# Patient Record
Sex: MALE | Employment: FULL TIME | URBAN - METROPOLITAN AREA
[De-identification: names, ages, dates, MRNs, and addresses within clinical notes are randomized per-mention and may not be internally consistent; named-entity substitution may affect disease eponyms.]

---

## 2023-05-01 ENCOUNTER — HOSPITAL ENCOUNTER (OUTPATIENT)
Age: 28
Discharge: HOME | End: 2023-05-01
Payer: COMMERCIAL

## 2023-05-01 VITALS
BODY MASS INDEX: 27.4 KG/M2 | HEIGHT: 69 IN | OXYGEN SATURATION: 98 % | WEIGHT: 185 LBS | TEMPERATURE: 98 F | SYSTOLIC BLOOD PRESSURE: 157 MMHG | HEART RATE: 65 BPM | DIASTOLIC BLOOD PRESSURE: 78 MMHG | RESPIRATION RATE: 18 BRPM

## 2023-05-01 DIAGNOSIS — R09.81 NASAL SINUS CONGESTION: Primary | ICD-10-CM

## 2023-05-01 PROCEDURE — 99203 OFFICE O/P NEW LOW 30 MIN: CPT | Performed by: NURSE PRACTITIONER

## 2023-05-01 RX ORDER — FLUTICASONE PROPIONATE 50 MCG
2 SPRAY, SUSPENSION (ML) NASAL DAILY
Qty: 16 G | Refills: 0 | Status: SHIPPED | OUTPATIENT
Start: 2023-05-01 | End: 2023-05-31

## 2023-05-01 NOTE — DISCHARGE INSTRUCTIONS
Take the fluticasone nasal spray as prescribed. Cool mist humidifier in the same room. Hot steam showers. Push fluids, preferably water. Please read the attached discharge instructions. Go to your nearest ER for new or worsening symptoms.

## 2023-05-01 NOTE — ED INITIAL ASSESSMENT (HPI)
Pt c/o right eye and face pressure for the past week.   Pt had relief with mucinex d and hot showers

## 2024-05-25 ENCOUNTER — HOSPITAL ENCOUNTER (OUTPATIENT)
Age: 29
Discharge: HOME | End: 2024-05-25

## 2024-05-25 VITALS
OXYGEN SATURATION: 100 % | TEMPERATURE: 97 F | HEART RATE: 66 BPM | BODY MASS INDEX: 27.4 KG/M2 | DIASTOLIC BLOOD PRESSURE: 109 MMHG | SYSTOLIC BLOOD PRESSURE: 159 MMHG | WEIGHT: 185 LBS | RESPIRATION RATE: 20 BRPM | HEIGHT: 69 IN

## 2024-05-25 DIAGNOSIS — M62.830 BACK MUSCLE SPASM: Primary | ICD-10-CM

## 2024-05-25 RX ORDER — CYCLOBENZAPRINE HCL 10 MG
10 TABLET ORAL 3 TIMES DAILY PRN
Qty: 20 TABLET | Refills: 0 | Status: SHIPPED | OUTPATIENT
Start: 2024-05-25 | End: 2024-06-01

## 2024-05-25 RX ORDER — PREDNISONE 20 MG/1
40 TABLET ORAL DAILY
Qty: 10 TABLET | Refills: 0 | Status: SHIPPED | OUTPATIENT
Start: 2024-05-25 | End: 2024-05-30

## 2024-05-25 NOTE — DISCHARGE INSTRUCTIONS
Apply heat to the area 2-3 times a day for 20 minutes at a time.  Take over-the-counter NSAIDs such as ibuprofen or naproxen for pain and swelling.  May also take Tylenol for pain.  Take Flexeril as prescribed for muscle relaxant and prednisone to help with inflammation.  Take prednisone in the morning because it can keep you up at night.  May apply Biofreeze or icy hot to the area as well.  Do stretching exercises throughout today.

## 2024-05-25 NOTE — ED PROVIDER NOTES
Patient Seen in: [facility]      History     Chief Complaint   Patient presents with    Back Pain     Stated Complaint: back pain    Subjective:   28-year-old male presents today with sudden onset of back pain after lifting a grill.  States went to put it down when he stood back up fully immediate spasm grab type pain to the left lower back.  No numbness or weakness no tingling to the lower extremities.  Denies any other symptoms or concerns.  Did take Advil prior to arrival.  The patient's medication list, past medical history and social history elements as listed in today's nurse's notes were reviewed and agreed (except as otherwise stated in the HPI).  The patient's family history reviewed and determined to be noncontributory to the presenting problem            Objective:   History reviewed. No pertinent past medical history.           History reviewed. No pertinent surgical history.             Social History     Socioeconomic History    Marital status: Single   Tobacco Use    Smoking status: Never    Smokeless tobacco: Never   Vaping Use    Vaping status: Never Used   Substance and Sexual Activity    Alcohol use: Never    Drug use: Yes     Types: Cannabis     Comment: vape              Review of Systems    Positive for stated complaint: back pain  Other systems are as noted in HPI.  Constitutional and vital signs reviewed.      All other systems reviewed and negative except as noted above.    Physical Exam     ED Triage Vitals [05/25/24 1339]   BP (!) 159/109   Pulse 66   Resp 20   Temp 97.3 °F (36.3 °C)   Temp src Temporal   SpO2 100 %   O2 Device None (Room air)       Current Vitals:   Vital Signs  BP: (!) 159/109  Pulse: 66  Resp: 20  Temp: 97.3 °F (36.3 °C)  Temp src: Temporal    Oxygen Therapy  SpO2: 100 %  O2 Device: None (Room air)            Physical Exam  Vitals and nursing note reviewed.   Constitutional:       Appearance: Normal appearance.   HENT:      Mouth/Throat:      Mouth: Mucous  membranes are moist.   Cardiovascular:      Rate and Rhythm: Normal rate.   Pulmonary:      Effort: Pulmonary effort is normal.   Musculoskeletal:      Comments: Left lower and mid back.  No bony tenderness no step-off.   Skin:     General: Skin is warm and dry.   Neurological:      General: No focal deficit present.      Mental Status: He is alert and oriented to person, place, and time.      Comments: Good dorsiflexion bilateral great toes.  Normal straight leg raise but with increased pain.  Normal sensation upper lower legs.               ED Course   Labs Reviewed - No data to display                   MDM   Please note that this report has been produced using speech recognition software and may contain errors related to that system including, but not limited to, errors in grammar, punctuation, and spelling, as well as words and phrases that possibly may have been recognized inappropriately.  If there are any questions or concerns, contact the dictating provider for clarification.        Note to patient: The 21st Century Cures Act makes medical notes like these available to patients in the interest of transparency. However, this is a medical document intended as peer to peer communication. It is written in medical language and may contain abbreviations or verbiage that are unfamiliar. It may appear blunt or direct. Medical documents are intended to carry relevant information, facts as evident, and the clinical opinion of the practitioner.                                   Medical Decision Making  Differential diagnosis includes but is not limited to: Back strain, nerve root compression, degenerative disc disease, spinal stenosis, sciatica      Presents today with injury to the left lower mid back after lifting a grill.  Has pain and stiffness.  Did take Advil prior to arrival.  Suspect muscle spasm/strain.  Will give prescription for prednisone as well as Flexeril.  Back care instructions given.  To follow-up  with primary care physician 1 week if symptoms do not improve.  Go directly to ER with any uncontrolled pain numbness weakness or any worsening symptoms.  Patient verbalized understanding and agreed to plan of care.    Risk  OTC drugs.  Prescription drug management.        Disposition and Plan     Clinical Impression:  1. Back muscle spasm         Disposition:  Discharge  5/25/2024  1:47 pm    Follow-up:  Haim Cantor MD  [address]  [facility] [address]  [phone]    In 1 week  As needed          Medications Prescribed:  Current Discharge Medication List        START taking these medications    Details   predniSONE 20 MG Oral Tab Take 2 tablets (40 mg total) by mouth daily for 5 days.  Qty: 10 tablet, Refills: 0      cyclobenzaprine 10 MG Oral Tab Take 1 tablet (10 mg total) by mouth 3 (three) times daily as needed for Muscle spasms.  Qty: 20 tablet, Refills: 0

## 2025-04-20 ENCOUNTER — HOSPITAL ENCOUNTER (OUTPATIENT)
Age: 30
Discharge: HOME | End: 2025-04-20
Payer: COMMERCIAL

## 2025-04-20 ENCOUNTER — APPOINTMENT (OUTPATIENT)
Dept: GENERAL RADIOLOGY | Age: 30
End: 2025-04-20
Attending: NURSE PRACTITIONER
Payer: COMMERCIAL

## 2025-04-20 VITALS
SYSTOLIC BLOOD PRESSURE: 153 MMHG | HEART RATE: 60 BPM | TEMPERATURE: 98 F | RESPIRATION RATE: 18 BRPM | DIASTOLIC BLOOD PRESSURE: 90 MMHG | OXYGEN SATURATION: 98 %

## 2025-04-20 DIAGNOSIS — S39.012A STRAIN OF LUMBAR REGION, INITIAL ENCOUNTER: Primary | ICD-10-CM

## 2025-04-20 LAB
BILIRUB UR QL STRIP: NEGATIVE
CLARITY UR: CLEAR
COLOR UR: YELLOW
GLUCOSE UR STRIP-MCNC: NEGATIVE MG/DL
HGB UR QL STRIP: NEGATIVE
KETONES UR STRIP-MCNC: NEGATIVE MG/DL
LEUKOCYTE ESTERASE UR QL STRIP: NEGATIVE
NITRITE UR QL STRIP: NEGATIVE
PH UR STRIP: 7 [PH]
PROT UR STRIP-MCNC: NEGATIVE MG/DL
SP GR UR STRIP: 1.02
UROBILINOGEN UR STRIP-ACNC: <2 MG/DL

## 2025-04-20 PROCEDURE — 72110 X-RAY EXAM L-2 SPINE 4/>VWS: CPT | Performed by: NURSE PRACTITIONER

## 2025-04-20 PROCEDURE — 96372 THER/PROPH/DIAG INJ SC/IM: CPT | Performed by: NURSE PRACTITIONER

## 2025-04-20 PROCEDURE — 99203 OFFICE O/P NEW LOW 30 MIN: CPT | Performed by: NURSE PRACTITIONER

## 2025-04-20 PROCEDURE — 81002 URINALYSIS NONAUTO W/O SCOPE: CPT | Performed by: NURSE PRACTITIONER

## 2025-04-20 RX ORDER — CYCLOBENZAPRINE HCL 10 MG
10 TABLET ORAL 3 TIMES DAILY PRN
Qty: 20 TABLET | Refills: 0 | Status: SHIPPED | OUTPATIENT
Start: 2025-04-20 | End: 2025-04-27

## 2025-04-20 RX ORDER — PREDNISONE 20 MG/1
40 TABLET ORAL DAILY
Qty: 8 TABLET | Refills: 0 | Status: SHIPPED | OUTPATIENT
Start: 2025-04-20 | End: 2025-04-24

## 2025-04-20 RX ORDER — PREDNISONE 20 MG/1
60 TABLET ORAL ONCE
Status: COMPLETED | OUTPATIENT
Start: 2025-04-20 | End: 2025-04-20

## 2025-04-20 RX ORDER — LIDOCAINE 50 MG/G
1 PATCH TOPICAL EVERY 24 HOURS
Qty: 4 PATCH | Refills: 0 | Status: SHIPPED | OUTPATIENT
Start: 2025-04-20 | End: 2025-04-24

## 2025-04-20 RX ORDER — KETOROLAC TROMETHAMINE 30 MG/ML
30 INJECTION, SOLUTION INTRAMUSCULAR; INTRAVENOUS ONCE
Status: COMPLETED | OUTPATIENT
Start: 2025-04-20 | End: 2025-04-20

## 2025-04-20 NOTE — DISCHARGE INSTRUCTIONS
You may take Motrin 600mg as needed every 6-8 hours. Take this with food.  Take the steroid as directed however do not begin this until tomorrow as you had your first dose here in the immediate care today.  If additional pain control is needed, you may use the muscle relaxant as directed however be aware this medication can cause drowsiness.    Rest from exacerbating activities but do not stay sedentary. Apply ice for 20min at a time every 2 hours while awake when possible for the next 3 days. Follow up with your primary care provider within the next 3 days - if symptoms do not resolve, you may need further treatment. Seek additional care in the ER immediately for numbness in your groin, loss of bowel or bladder function, inability to walk, or other new/worsening symptoms.

## 2025-04-20 NOTE — ED INITIAL ASSESSMENT (HPI)
Patient states he brought groceries in to the house yesterday and after putting them down he stood up and felt pain in his left lower back.

## 2025-04-20 NOTE — ED PROVIDER NOTES
Patient Seen in: [facility]    History   CC: back pain  HPI: Florian Marr 29 year old male  who presents c/o diffuse lower back pain however worse on the left as compared to the right s/p incident in which pt states he was carrying groceries and set them on the ground. Upon standing, felt sudden onset pain. Has been heating and icing with worsening pain thus prompting eval. Similar pain last year seen in this IC and given steroids and muscle relaxants with improvement. Denies abd pain, n/v/d/c, urinary s/s, fever, rash, radicular s/s including radiation of pain, numbness/tingling/weakness or limitation in rom to LE bilat, saddle anesthesia's or loss of bowel/bladder control.     Past Medical History[1]    Past Surgical History[2]    Family History[3]    Short Social Hx on File[4]    ROS:  Systems reviewed: All pertinent positives noted in HPI. Unless otherwise noted, additional systems reviewed are negative.   Vital signs reviewed.    Positive for stated complaint: back pain  Other systems are as noted in HPI.  Constitutional and vital signs reviewed.      All other systems reviewed and negative except as noted above.    PSFH elements reviewed from today and agreed except as otherwise stated in HPI.             Constitutional and vital signs reviewed.        Physical Exam     ED Triage Vitals [04/20/25 0813]   /90   Pulse 60   Resp 18   Temp 98.1 °F (36.7 °C)   Temp src Oral   SpO2 98 %   O2 Device None (Room air)       Current:/90   Pulse 60   Temp 98.1 °F (36.7 °C) (Oral)   Resp 18   SpO2 98%         PE:  General - Appears well, non-toxic and in NAD  Head - Appears symmetrical without deformity/swelling cranium, scalp, or facial bones  GI - Appears round and flat, BS +x4 quadrants, no tenderness/guarding with palpation  Skin - no rashes or petechiae noted, pink warm and dry throughout, mmm, cap refill <2seconds  Neuro - A&O x4, sensation equal to both medial and lateral aspects of  lower extremities, steady gait  MSK - makes purposeful movements of lower extremities with full ROM noted, foot press/dorsiflexion and straight leg raise strength equal bilat, pedal pulses 2+ bilat.  +lumbar midline spinal tenderness without obvious sign of trauma/swelling/deformity, +left sided Paraspinous muscle tenderness, +flexion of the 1st and 5th toes bilat.  Psych - Interactive and appropriate      ED Course     Labs Reviewed   [facility] POCT URINALYSIS DIPSTICK       MDM     XR LUMBAR SPINE (MIN 4 VIEWS) (CPT=72110)   Final Result                     =====   PROCEDURE:  XR LUMBAR SPINE (MIN 4 VIEWS) (CPT=72110)       TECHNIQUE:  AP, lateral, oblique, and coned down L5-S1 views were    obtained.       COMPARISON:  None.       INDICATIONS:  lower back pain upon standing last night. No radicular s/s    or trauma       PATIENT STATED HISTORY: (As transcribed by Technologist)  Patient states    he developed lower back pain after lifting cases of water yesterday.             FINDINGS:   Minimal levoscoliosis of the lumbar spine.  Minimal 2 mm retrolisthesis of    L4 over L5.   Vertebral body heights are maintained throughout the lumbar spine.   Moderate loss of L5-S1 disc space.  Mild facet hypertrophic changes at    L4-S1.   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           LOCATION:  [facility]           Dictated by (CST): Pawel Villa MD on 4/20/2025 at 8:28 AM        Finalized by (CST): Pawel Villa MD on 4/20/2025 at 8:29 AM             DDx: Fracture versus sprain versus radiculopathy versus cystitis    X-ray results as noted above without acute osseous abnormality.  Mild osteoarthritic changes discussed with patient as well as general sprain/strain and muscle spasm instructions, rest, ice, Tylenol or Motrin as needed for discomfort, prescriptions as written indication/precautions and use reviewed, follow-up and return/ED precautions reviewed. Patient is  historian and demonstrates understanding of all instruction and agrees with plan of care.      Disposition and Plan     Clinical Impression:  1. Strain of lumbar region, initial encounter        Disposition:  Discharge    Follow-up:  Chon Katz MD  [address]  [address] [address] [phone]    Go in 1 week  As needed      Medications Prescribed:  Discharge Medication List as of 4/20/2025  8:34 AM        START taking these medications    Details   predniSONE 20 MG Oral Tab Take 2 tablets (40 mg total) by mouth daily for 4 days., Print, Disp-8 tablet, R-0      cyclobenzaprine 10 MG Oral Tab Take 1 tablet (10 mg total) by mouth 3 (three) times daily as needed for Muscle spasms., Print, Disp-20 tablet, R-0      lidocaine 5 % External Patch Place 1 patch onto the skin daily for 4 days., Print, Disp-4 patch, R-0                      [1] History reviewed. No pertinent past medical history.  [2] History reviewed. No pertinent surgical history.  [3] No family history on file.  [4]   Social History  Socioeconomic History    Marital status: Single   Tobacco Use    Smoking status: Never    Smokeless tobacco: Never   Vaping Use    Vaping status: Never Used   Substance and Sexual Activity    Alcohol use: Never    Drug use: Yes     Types: Cannabis     Comment: vape